# Patient Record
(demographics unavailable — no encounter records)

---

## 2025-04-25 NOTE — HISTORY OF PRESENT ILLNESS
[de-identified] : This 79-year-old woman is seen in the office today along with her son and her  on the phone and speak English.  She is seen with an .  She denies a prior history of back problems.  1 month ago she had the spontaneous onset of bilateral lower back pain graded as an 8 with radiation to the right buttock and lower extremity graded as a 10 into the left lower extremity graded as a 5.  She denies numbness or weakness.  There may be some tingling.  The pain is worse with coughing, sneezing and forcing to move her bowels.  She has had night pain.  She was seen in the emergency room at [facility] where Robaxin was prescribed and at [facility] where arthritis strength Tylenol was prescribed.  She comes in with all the medicines.  Naprosyn 500 mg was prescribed 5 months ago for an unknown problem but it they said it was for headaches.  She is also on medication for migraines.  Her past medical history is positive for prior hysterectomy for benign disease.  As noted above she has migraines.  She is on alendronate for osteoporosis as well as on medication for hypertension and hyperlipidemia.  She is on omeprazole.  She has a penicillin allergy.

## 2025-04-25 NOTE — DISCUSSION/SUMMARY
[Medication Risks Reviewed] : Medication risks reviewed [de-identified] : She will rest and use moist heat.  She is apparently on omeprazole for GI issues and it controls the issues.  Since she took most of a full 60 pill prescription for Naprosyn in November and tolerated it without side effects I chose to put her on Naprosyn as a nonsteroidal anti-inflammatory as she is tolerated in the past for some other problem.  I have recommended caution with the muscle relaxant medicine as I find no muscle spasm on the exam and it could lead to dizziness or unsteadiness in the fall which would be a problem with underlying osteoporosis.  She will push the arthritis strength Tylenol to the full dose.  I will see her for follow-up in 4 weeks.  They will contact us if there are problems with the medication.  They understand and it has been explained that the Naprosyn is not prescribed as a pain medication but as an anti-inflammatory and it can take time to work.

## 2025-04-25 NOTE — REASON FOR VISIT
[Degenerative Joint Disease] : degenerative joint disease [Back Pain] : back pain [Radiculopathy] : radiculopathy [Spouse] : spouse [Family Member] : family member

## 2025-04-25 NOTE — PHYSICAL EXAM
[de-identified] : There was marked difficulty getting her to cooperate for an exam.  She moves around the examining room very slowly with complaints of pain.  She is considerably overweight.  On exam there is no paravertebral muscle spasm.  I could not get her to lift either leg up on a small stool.  She could not climb up onto the examining table.  There were no cutaneous abnormalities or palpable bony defects of the spine.  There is no apparent respiratory distress or shortness of breath.  Her lower extremity neurological examination revealed trace to 1+ symmetrical reflexes.  She complained of pain when I tested her reflexes which is usually very indicative of poor pain tolerance.  Motor power was normal to manual testing all lower extremity groups.  Straight leg raising was negative to 90 degrees in the sitting position in the chair.  Vascular examination showed no evidence of significant varicosities and there was no lymphedema. [de-identified] : I obtained AP and lateral x-rays of the lumbar spine in the office.  The osseous structures did appear osteoporotic.  Disc heights were surprisingly well-maintained for someone her age.  There is some mild facet arthrosis and a mild retrolisthesis at L5-S1.  There is some evidence of aortic calcification.  There is an old appearing superior endplate compression fracture of T11.  There are no destructive changes.